# Patient Record
Sex: FEMALE | Race: WHITE | NOT HISPANIC OR LATINO
[De-identification: names, ages, dates, MRNs, and addresses within clinical notes are randomized per-mention and may not be internally consistent; named-entity substitution may affect disease eponyms.]

---

## 2022-03-15 ENCOUNTER — NON-APPOINTMENT (OUTPATIENT)
Age: 72
End: 2022-03-15

## 2022-03-15 DIAGNOSIS — Z86.59 PERSONAL HISTORY OF OTHER MENTAL AND BEHAVIORAL DISORDERS: ICD-10-CM

## 2022-03-15 DIAGNOSIS — Z86.39 PERSONAL HISTORY OF OTHER ENDOCRINE, NUTRITIONAL AND METABOLIC DISEASE: ICD-10-CM

## 2022-03-15 DIAGNOSIS — Z82.3 FAMILY HISTORY OF STROKE: ICD-10-CM

## 2022-03-15 DIAGNOSIS — J45.20 MILD INTERMITTENT ASTHMA, UNCOMPLICATED: ICD-10-CM

## 2022-03-15 DIAGNOSIS — Z87.09 PERSONAL HISTORY OF OTHER DISEASES OF THE RESPIRATORY SYSTEM: ICD-10-CM

## 2022-03-15 DIAGNOSIS — N30.10 INTERSTITIAL CYSTITIS (CHRONIC) W/OUT HEMATURIA: ICD-10-CM

## 2022-03-15 PROBLEM — Z00.00 ENCOUNTER FOR PREVENTIVE HEALTH EXAMINATION: Status: ACTIVE | Noted: 2022-03-15

## 2022-03-15 RX ORDER — LEVOTHYROXINE SODIUM 125 UG/1
125 TABLET ORAL
Refills: 0 | Status: ACTIVE | COMMUNITY

## 2022-03-15 RX ORDER — ESCITALOPRAM OXALATE 5 MG/1
5 TABLET, FILM COATED ORAL
Refills: 0 | Status: ACTIVE | COMMUNITY

## 2022-03-15 RX ORDER — BUPROPION HYDROCHLORIDE 150 MG/1
150 TABLET, EXTENDED RELEASE ORAL
Refills: 0 | Status: ACTIVE | COMMUNITY

## 2022-03-22 ENCOUNTER — APPOINTMENT (OUTPATIENT)
Dept: GASTROENTEROLOGY | Facility: CLINIC | Age: 72
End: 2022-03-22
Payer: MEDICARE

## 2022-03-22 VITALS
OXYGEN SATURATION: 98 % | HEART RATE: 64 BPM | BODY MASS INDEX: 30.73 KG/M2 | SYSTOLIC BLOOD PRESSURE: 133 MMHG | HEIGHT: 64 IN | WEIGHT: 180 LBS | DIASTOLIC BLOOD PRESSURE: 70 MMHG

## 2022-03-22 DIAGNOSIS — Z82.49 FAMILY HISTORY OF ISCHEMIC HEART DISEASE AND OTHER DISEASES OF THE CIRCULATORY SYSTEM: ICD-10-CM

## 2022-03-22 DIAGNOSIS — Z81.8 FAMILY HISTORY OF OTHER MENTAL AND BEHAVIORAL DISORDERS: ICD-10-CM

## 2022-03-22 DIAGNOSIS — Z86.39 PERSONAL HISTORY OF OTHER ENDOCRINE, NUTRITIONAL AND METABOLIC DISEASE: ICD-10-CM

## 2022-03-22 PROCEDURE — 99204 OFFICE O/P NEW MOD 45 MIN: CPT

## 2022-03-22 PROCEDURE — 99072 ADDL SUPL MATRL&STAF TM PHE: CPT

## 2022-03-22 RX ORDER — GABAPENTIN 600 MG/1
600 TABLET, COATED ORAL
Refills: 0 | Status: ACTIVE | COMMUNITY

## 2022-03-22 RX ORDER — ATORVASTATIN CALCIUM 10 MG/1
10 TABLET, FILM COATED ORAL
Refills: 0 | Status: ACTIVE | COMMUNITY

## 2022-03-22 NOTE — HISTORY OF PRESENT ILLNESS
[de-identified] : \par This HPI  reflects a summary and review of records : including previous and most recent  Labs, body imaging, consults and progress notes, operative and pathology reports, EKG reports, ED records, found in [employer], [employer],  [facility] and any additional records brought in by  the patient at the time of the visit.\par \par \par PCP: Morgan \par \par 72 yo F w h/o Asthma, Allergies, HLD, Interstitial Cystitis, Hypothyroidism , Eczema, Sinusitis\par Colon Polyps, Diverticulosis, Hemorrhoids\par GERD w LPR , Hepatic cysts\par \par 3/22/22     Today:  Feeling well, no c/o , CP, SOB/ DAMIAN, Cough, Wheeze, Palpitations, edema\par \par   Today:  Occas Ht burn, + throat clearing, No dysphagia\par                Intermittent LLQ pain, constipation, strain and bloat\par                No fever, chills, mucous MS \par                Intermittent rectal bleeding on paper\par \par * Abd pain-->LLQ \par * Nausea--> no\par * Vomit--> no\par * Early satiety--> no\par * Belching--> no\par * Hiccups--> no\par * Regurgitation--> no\par * Acid Taste / Water Brash--> no\par * Ht burn--> no\par * Dysphagia--> no\par * Throat Clearing--> yes\par * Hoarseness--> no\par * Post-Nasal Drip--> no\par * Congestion--> no\par * Globus--> no\par * Cough--> no\par * Wheeze / PC-> -no\par * BMs: # 1-2 qd, w strain, w colace # 5-6 \par * Constipation--> yes\par * Diarrhea--> no\par * Bloating--> yes\par * Strain on Defecation--> yes\par * Incompl Evac--> yes\par * Flatulence--> no\par * Gurgling--> no\par * Melena--> no\par * BPBPR-> -on paper \par * Anorexia--> no\par * Wt. Loss--> no\par \par

## 2022-03-22 NOTE — ASSESSMENT
[FreeTextEntry1] : \par LLQ pain\par assoc w constipation, bloat, strain w rectal bleeding\par \par R/O Diverticular dis\par        Essential vs CIC w hemorrhoidal bleeding \par        IBD \par        Colonic Neoplasia\par        Functional Bowel Dis\par \par P:  \par Recommend: \par * Diet: moderate -High Fiber,  Low Fat & Lactose free, Low FODMAPs--was reviewed & emphasized\par * Daily fluid intake was reviewed : 6  --  8 cups of decaffeinated fluid daily was emphasized\par * Regular aerobic exercise was emphasized\par * Probiotics  1  daily\par * Miralax: 1 cap qd\par * Neuromodulation: on Lexapro / Mikey\par \par \par \par \par \par \par 2. GERD:  occas ht burn, +   throat clear,  No dysphagia, \par * ++  LPR,  Gardiner’s w / w/o Dysplasia,  or h/o  Esophagitis grade: A--  was found \par \par Recommend: \par * Anti-reflux diet & life-style changes reviewed & re-emphasized.  \par * HOB elevation /  Bedge use emphasized\par * Weight reduction & regular exercise emphasized\par \par * ++ PPI use : Prilosec 20mg OTC       --  as needed was emphasized\par No need for  H2B q HS:  \par No need for Carafate  1 gram \par I previously reviewed & summarized the prospective randomized & retrospective non-randomized studies\par looking at potential long term SE's of PPIs, w special attention to associations & actual cause\par as related to GI infections, bone loss, cognitive changes, KD, Covid, vitamin & electrolyte deficiencies\par questions were answered, pt advised that PPIs should be used when needed as indicated by their\par clinical indication and response and tapering off is always the goal if possible. pt understood.\par \par * No  need for pH Monitor,  Manometry, or  Esophagram \par * No  need for ENT  eval/F/U, \par * No  need for  Surgical  eval  \par \par * No F/U  EGD: --for Barretts screening / surveillance needed \par \par \par \par \par \par \par 3. Diverticulosis:  well - controlled.  No pain,  infection,  bleeding\par Recommendations\par * Discussed  and  reviewed the potential complications of perforation,  pain,  infection,  bleeding \par * Moderate-High  Fiber Diet was reviewed & emphasized\par * Daily fluid intake was reviewed : 6  --  8 cups of decaffeinated fluid daily\par \par \par \par \par \par \par 4. Hemorrhoids:  well - controlled.  No pain,  swelling,  itch,  bleeding\par * Discussed the  potential complications of thrombosis,  pain,  infection,  swelling, itching,  bleeding --none recently\par Recommendations: \par * Moderate- High  Fiber Diet was reviewed and emphasized\par * 6  --  8 cups of decaffeinated fluid daily was emphasized \par * Sitz Bathes as needed ,  No:  Anusol HC  Suppos / Cream  NV BID -- was needed\par * No:  Tucks BID,  Balneol Lotion,   Calmoseptine Oint -- was needed ;    can use  Prep H prn\par * No:  need for  Colorectal surgical evaluation for possible ablation \par \par \par \par \par \par 5. .Liver Cyst--asymptomatic\par      check LFTs\par     abd  sonogram--to establish stability \par \par \par \par \par \par 6. Colorectal   Neoplasia  Screening:  to be evaluated\par   Utilizing teaching posters and anatomical models the following were discussed and emphasized with the patient in detail: \par * Discussed the pre-malignant potential of polyps\par * Discussed the importance of f/u surveillance / screening colonoscopy \par * moderate-High  Fiber Diet was reviewed and emphasized\par * Anti-oxidants and ASA/NSAID Therapy emphasized\par * Given age > 40-49 yo & +PH Colon Polyps \par * Recommend Colonoscopy  to  R/O  Colonic Neoplasia-- in 2022\par \par \par \par \par Informed Consent:\par * The risks & Benefits of   Colonoscopy were discussed w patient.\par * This included but was not limited to perforation, bleeding, sedation /med rxns possibly requiring surgery, blood transfusions, antibiotics & CPR/Intubation.\par * Pt. understands & agrees to the procedures.\par The following instructions in regards to the prep and medically essential ( cardiac, pulmonary, sz, psych, endocrine)  pre-op medication administration\par was reviewed and emphasized with the patient . \par * Pt. advised to D/C  ASA/NSAIDs  7  Days  PTP.\par * [ +++ ]  Dulcolax / Miralax / Mag. Citrate ,  [     ] Prepopik/ Clenpiq ,  [     ] Osmo Prep,  [    ] GoLytely,  prep. reviewed w Pt.\par * Hold  [           ] AM of procedure.\par * Hold  [           ] PM  before procedure.\par * Take  [           ] PM  before procedure.\par * Take  [           ] AM of procedure.\par \par

## 2022-05-06 ENCOUNTER — NON-APPOINTMENT (OUTPATIENT)
Age: 72
End: 2022-05-06

## 2022-05-08 ENCOUNTER — RESULT REVIEW (OUTPATIENT)
Age: 72
End: 2022-05-08

## 2022-05-11 ENCOUNTER — APPOINTMENT (OUTPATIENT)
Dept: GASTROENTEROLOGY | Facility: HOSPITAL | Age: 72
End: 2022-05-11

## 2022-05-23 ENCOUNTER — APPOINTMENT (OUTPATIENT)
Dept: GASTROENTEROLOGY | Facility: CLINIC | Age: 72
End: 2022-05-23
Payer: COMMERCIAL

## 2022-05-23 PROCEDURE — 99215 OFFICE O/P EST HI 40 MIN: CPT

## 2022-05-23 NOTE — HISTORY OF PRESENT ILLNESS
[de-identified] : \par This HPI  reflects a summary and review of records : including previous and most recent  Labs, body imaging, consults and progress notes, operative and pathology reports, EKG reports, ED records, found in [employer], [employer],  [employer] and any additional records brought in by  the patient at the time of the visit.\par \par \par PCP: Morgan \par \par 70 yo F w h/o Asthma, Allergies, HLD, Interstitial Cystitis, Hypothyroidism , Eczema, Sinusitis\par Colon Polyps, Diverticulosis, Hemorrhoids\par GERD w LPR , Hepatic cysts\par \par 3/22/22   Init CC: c/o  Occas Ht burn, + throat clearing, No dysphagia\par                Intermittent LLQ pain, constipation, strain and bloat.+ Inc evac\par                * BMs: # 1-2 qd, w strain, w colace # 5-6 \par                No fever, chills, mucous [place] \par                Intermittent rectal bleeding on paper\par \par \par 5/23/22 Today:  Feeling well, no c/o , CP, SOB/ DAMIAN, Cough, Wheeze, Palpitations, edema\par                         Now eating better, more Fiber also taking stool softner qd\par                          BMs: # 4-5 qd, less strain and bloat \par * Abd pain-->resolved \par * Nausea--> no\par * Vomit--> no\par * Early satiety--> no\par * Belching--> no\par * Hiccups--> no\par * Regurgitation--> no\par * Acid Taste / Water Brash--> no\par * Ht burn--> no\par * Dysphagia--> no\par * Throat Clearing--> occas \par * Hoarseness--> no\par * Post-Nasal Drip--> no\par * Congestion--> no\par * Globus--> no\par * Cough--> no\par * Wheeze / PC-> -no\par * BMs: # \par * Constipation--> better \par * Diarrhea--> no\par * Bloating--> less\par * Strain on Defecation--> less\par * Incompl Evac--> \par * Flatulence--> no\par * Gurgling--> no\par * Melena--> no\par * BPBPR-> \par * Anorexia--> no\par * Wt. Loss--> no\par \par

## 2022-05-23 NOTE — REVIEW OF SYSTEMS
[Skin Lesions] : no skin lesions [Confused] : no confusion [Proptosis] : no proptosis [Easy Bruising] : no tendency for easy bruising

## 2022-05-23 NOTE — ASSESSMENT
[FreeTextEntry1] : \par LLQ pain--> resolved \par assoc w constipation, bloat, strain w rectal bleeding--> improved w fiber\par \par R/O Diverticular dis\par        Essential vs CIC w hemorrhoidal bleeding \par        IBD \par        Colonic Neoplasia\par        Functional Bowel Dis\par \par P:  \par Recommend: \par * Diet: moderate -High Fiber,  Low Fat & Lactose free, Low FODMAPs--was emphasized\par * Daily fluid intake was reviewed : 6  --  8 cups of decaffeinated fluid daily was emphasized\par * Regular aerobic exercise was emphasized\par * Probiotics  1  daily\par * Miralax: 1 cap qd\par * Neuromodulation: on Lexapro / Mikey\par \par Colonoscopy \par \par \par \par \par \par 2. GERD: better, no ht burn, + occas   throat clear,  No dysphagia, \par * ++  LPR,  No Gardiner’s w / w/o Dysplasia,  or h/o  Esophagitis grade: A--  was found \par \par Recommend: \par * Anti-reflux diet & life-style changes reviewed & re-emphasized.  \par * HOB elevation /  Bedge use emphasized\par * Weight reduction & regular exercise emphasized\par \par * ++ PPI use : Prilosec 20mg OTC       --  as needed was emphasized\par No need for  H2B q HS:  \par No need for Carafate  1 gram \par I previously reviewed & summarized the prospective randomized & retrospective non-randomized studies\par looking at potential long term SE's of PPIs, w special attention to associations & actual cause\par as related to GI infections, bone loss, cognitive changes, KD, Covid, vitamin & electrolyte deficiencies\par questions were answered, pt advised that PPIs should be used when needed as indicated by their\par clinical indication and response and tapering off is always the goal if possible. pt understood.\par \par * No  need for pH Monitor,  Manometry, or  Esophagram \par * No  need for ENT  eval/F/U, \par * No  need for  Surgical  eval  \par \par * No F/U  EGD: --for Barretts screening / surveillance needed \par \par \par \par \par \par \par 3. Diverticulosis:  well - controlled.  No pain,  infection,  bleeding\par Recommendations\par * Discussed   previously and  reviewed the potential complications of perforation,  pain,  infection,  bleeding \par * Moderate-High  Fiber Diet was emphasized\par * Daily fluid intake was reviewed : 6  --  8 cups of decaffeinated fluid daily\par \par \par \par \par \par \par 4. Hemorrhoids:  well - controlled.  No pain,  swelling,  itch,  bleeding\par * Discussed  previously\par  the  potential complications of thrombosis,  pain,  infection,  swelling, itching,  bleeding --none recently\par Recommendations: \par * Moderate- High  Fiber Diet was  emphasized\par * 6  --  8 cups of decaffeinated fluid daily was emphasized \par * Sitz Bathes as needed ,  No:  Anusol HC  Suppos / Cream  NV BID -- was needed\par * No:  Tucks BID,  Balneol Lotion,   Calmoseptine Oint -- was needed ;    can use  Prep H prn\par * No:  need for  Colorectal surgical evaluation for possible ablation \par \par \par \par \par \par 5. .Liver Cyst--asymptomatic\par      check LFTs\par     abd  sonogram--to establish stability \par \par \par \par \par \par 6. Colorectal   Neoplasia  Screening:  to be evaluated\par   Utilizing teaching posters and anatomical models the following were discussed and emphasized with the patient in detail: \par * Discussed the pre-malignant potential of polyps\par * Discussed the importance of f/u surveillance / screening colonoscopy \par * moderate-High  Fiber Diet was  emphasized\par * Anti-oxidants and ASA/NSAID Therapy emphasized\par * Given age > 40-49 yo & +PH Colon Polyps \par * Recommend Colonoscopy  to  R/O  Colonic Neoplasia-- in 2022\par \par \par \par \par Informed Consent:\par * The risks & Benefits of   Colonoscopy were discussed w patient.\par * This included but was not limited to perforation, bleeding, sedation /med rxns possibly requiring surgery, blood transfusions, antibiotics & CPR/Intubation.\par * Pt. understands & agrees to the procedures.\par The following instructions in regards to the prep and medically essential ( cardiac, pulmonary, sz, psych, endocrine)  pre-op medication administration\par was reviewed and emphasized with the patient . \par * Pt. advised to D/C  ASA/NSAIDs  7  Days  PTP.\par * [ +++ ]  Dulcolax / Miralax / Mag. Citrate ,  [     ] Prepopik/ Clenpiq ,  [     ] Osmo Prep,  [    ] GoLytely,  prep. reviewed w Pt.\par * Hold  [           ] AM of procedure.\par * Hold  [           ] PM  before procedure.\par * Take  [           ] PM  before procedure.\par * Take  [           ] AM of procedure.\par \par

## 2022-06-14 ENCOUNTER — RESULT REVIEW (OUTPATIENT)
Age: 72
End: 2022-06-14

## 2022-06-16 ENCOUNTER — RESULT REVIEW (OUTPATIENT)
Age: 72
End: 2022-06-16

## 2022-06-17 ENCOUNTER — APPOINTMENT (OUTPATIENT)
Dept: GASTROENTEROLOGY | Facility: HOSPITAL | Age: 72
End: 2022-06-17

## 2023-01-21 RX ORDER — AMOXICILLIN AND CLAVULANATE POTASSIUM 875; 125 MG/1; MG/1
875-125 TABLET, COATED ORAL
Qty: 20 | Refills: 0 | Status: ACTIVE | COMMUNITY
Start: 2023-01-21 | End: 1900-01-01

## 2023-01-29 ENCOUNTER — NON-APPOINTMENT (OUTPATIENT)
Age: 73
End: 2023-01-29

## 2023-02-16 ENCOUNTER — APPOINTMENT (OUTPATIENT)
Dept: GASTROENTEROLOGY | Facility: CLINIC | Age: 73
End: 2023-02-16
Payer: COMMERCIAL

## 2023-02-16 VITALS
HEIGHT: 64 IN | WEIGHT: 184 LBS | SYSTOLIC BLOOD PRESSURE: 124 MMHG | BODY MASS INDEX: 31.41 KG/M2 | DIASTOLIC BLOOD PRESSURE: 68 MMHG | HEART RATE: 65 BPM

## 2023-02-16 DIAGNOSIS — K64.8 OTHER HEMORRHOIDS: ICD-10-CM

## 2023-02-16 DIAGNOSIS — K57.30 DIVERTICULOSIS OF LARGE INTESTINE W/OUT PERFORATION OR ABSCESS W/OUT BLEEDING: ICD-10-CM

## 2023-02-16 DIAGNOSIS — K63.5 POLYP OF COLON: ICD-10-CM

## 2023-02-16 DIAGNOSIS — K59.00 CONSTIPATION, UNSPECIFIED: ICD-10-CM

## 2023-02-16 DIAGNOSIS — Z12.11 ENCOUNTER FOR SCREENING FOR MALIGNANT NEOPLASM OF COLON: ICD-10-CM

## 2023-02-16 DIAGNOSIS — K76.89 OTHER SPECIFIED DISEASES OF LIVER: ICD-10-CM

## 2023-02-16 DIAGNOSIS — K21.9 GASTRO-ESOPHAGEAL REFLUX DISEASE W/OUT ESOPHAGITIS: ICD-10-CM

## 2023-02-16 PROCEDURE — 99215 OFFICE O/P EST HI 40 MIN: CPT

## 2023-02-16 NOTE — ASSESSMENT
[FreeTextEntry1] : \par LLQ pain--> resolved \par s/p empiric RX for Diverticulosis \par assoc w constipation, bloat, strain w rectal bleeding--> improved w fiber\par \par 6/17/22 Colonoscopy:  0.75cm Asc polyp: HP; 0.5cm rectal polyp: adenoma;  \par                                        mild R & Mod L-sided diverticulosis,  2nd deg in hemorrhoids\par \par R/O Diverticular dis--> painful w spasm \par        Essential vs CIC w hemorrhoidal bleeding \par        Functional Bowel Dis\par \par P:  \par Recommend: \par * Diet: currently  Moderate  Fiber,  Low Fat & Lactose free, Low FODMAPs--was emphasized\par * Daily fluid intake was reviewed : 6  --  8 cups of decaffeinated fluid daily was emphasized\par * Regular aerobic exercise was emphasized\par * Probiotics  1  daily\par * Miralax: 1 cap qd\par * Neuromodulation: on Lexapro / Mikey\par \par \par \par \par \par \par \par 2. Diverticulosis:  well - controlled.  No pain,  infection,  bleeding\par    1/2023  s/p  empirical RX w Augmentum 875mg BID for Diverticulitis--> pt reported LLQ tenderness\par Recommendations\par * Discussed   previously and  reviewed the potential complications of perforation,  pain,  infection,  bleeding \par * currently moderate  Fiber Diet was emphasized\par * Daily fluid intake was reviewed : 6  --  8 cups of decaffeinated fluid daily\par \par \par \par \par \par \par \par 3. GERD: better, no ht burn, + occas   throat clear,  No dysphagia, \par * ++  LPR,  No Gardiner’s w / w/o Dysplasia,  or h/o  Esophagitis grade: A--  was found \par \par Recommend: \par * Anti-reflux diet & life-style changes reviewed & re-emphasized.  \par * HOB elevation /  Bedge use emphasized\par * Weight reduction & regular exercise emphasized\par \par * ++ PPI use : Prilosec 20mg OTC       --  as needed was emphasized\par No need for  H2B q HS:  \par No need for Carafate  1 gram \par I previously reviewed & summarized the prospective randomized & retrospective non-randomized studies\par looking at potential long term SE's of PPIs, w special attention to associations & actual cause\par as related to GI infections, bone loss, cognitive changes, KD, Covid, vitamin & electrolyte deficiencies\par questions were answered, pt advised that PPIs should be used when needed as indicated by their\par clinical indication and response and tapering off is always the goal if possible. pt understood.\par \par * No  need for pH Monitor,  Manometry, or  Esophagram \par * No  need for ENT  eval/F/U, \par * No  need for  Surgical  eval  \par \par * No F/U  EGD: --for Barretts screening / surveillance needed \par \par \par \par \par \par \par \par 4. Hemorrhoids:  well - controlled.  No pain,  swelling,  itch,  bleeding\par * Discussed  previously\par  the  potential complications of thrombosis,  pain,  infection,  swelling, itching,  bleeding --none recently\par Recommendations: \par *  currently Moderate  Fiber Diet was  emphasized\par * 6  --  8 cups of decaffeinated fluid daily was emphasized \par * Sitz Bathes as needed ,  No:  Anusol HC  Suppos / Cream  MD BID -- was needed\par * No:  Tucks BID,  Balneol Lotion,   Calmoseptine Oint -- was needed ;    can use  Prep H prn\par * No:  need for  Colorectal surgical evaluation for possible ablation \par \par \par \par \par \par 5. .Liver Cyst--asymptomatic\par      check LFTs\par     abd  sonogram--to establish stability \par \par \par \par \par \par 6. Colorectal   Neoplasia  Screening:  well controlled \par   Utilizing teaching posters and anatomical models the following were discussed and emphasized with the patient in detail: \par * Discussed the pre-malignant potential of polyps\par * Discussed the importance of f/u surveillance / screening colonoscopy \par * currently moderate  Fiber Diet was  emphasized\par * Anti-oxidants and ASA/NSAID Therapy emphasized\par * Given age > 40-51 yo & +PH Colon Polyps \par * Recommend Colonoscopy  to  R/O  Colonic Neoplasia-- in 6/2027\par \par \par \par \par

## 2023-02-16 NOTE — HISTORY OF PRESENT ILLNESS
[de-identified] : \par This HPI  reflects a summary and review of records : including previous and most recent  Labs, body imaging, consults and progress notes, operative and pathology reports, EKG reports, ED records, found in [employer], [employer],  [employer] and any additional records brought in by  the patient at the time of the visit.\par \par \par PCP: Morgan \par \par 73 yo F w h/o Asthma, Allergies, HLD, Interstitial Cystitis, Hypothyroidism , Eczema, Sinusitis\par Colon Polyps, Diverticulosis, Hemorrhoids\par GERD w LPR , Hepatic cysts\par \par 3/22/22   Init CC: c/o  Occas Ht burn, + throat clearing, No dysphagia\par                Intermittent LLQ pain, constipation, strain and bloat.+ Inc evac\par                * BMs: # 1-2 qd, w strain, w colace # 5-6 \par                No fever, chills, mucous [place] \par                Intermittent rectal bleeding on paper\par \par \par 5/23/22:  eating better, more Fiber also taking stool softner qd\par                BMs: # 4-5 qd, less strain and bloat \par \par 6/17/22 Colonoscopy:  0.75cm Asc polyp: HP; 0.5cm rectal polyp: adenoma;  \par                                        mild R & Mod L-sided diverticulosis,  2nd deg in hemorrhoids\par \par \par 1/21/23: c/o several days of LLQ pain, no blood or mucous, No fever, chills\par               pt states she was tender to touch in the LLQ\par               Rx empirically for diverticulitis w Augmentum 875 mg BID\par \par \par 2/16/23 Today:  Feeling well, no c/o , CP, SOB/ DAMIAN, Cough, Wheeze, Palpitations, edema\par                           The ABX worked, no abd pain\par                           wants to incr fiber intake bec of h/o CIC--takes colace \par                           Presently BMs: # 3-4 qd \par \par * Abd pain-->resolved \par * Nausea--> no\par * Vomit--> no\par * Early satiety--> no\par * Belching--> no\par * Hiccups--> no\par * Regurgitation--> no\par * Acid Taste / Water Brash--> no\par * Ht burn--> no\par * Dysphagia--> no\par * Throat Clearing--> occas \par * Hoarseness--> no\par * Post-Nasal Drip--> no\par * Congestion--> no\par * Globus--> no\par * Cough--> no\par * Wheeze / PC-> -no\par * BMs: # 3-4 qd \par * Constipation--> better \par * Diarrhea--> no\par * Bloating--> less\par * Strain on Defecation--> better \par * Incompl Evac--> better \par * Flatulence--> no\par * Gurgling--> no\par * Melena--> no\par * BPBPR-> \par * Anorexia--> no\par * Wt. Loss--> no\par \par

## 2024-03-10 PROBLEM — N30.10 INTERSTITIAL CYSTITIS: Status: RESOLVED | Noted: 2022-03-15 | Resolved: 2024-03-10
